# Patient Record
Sex: MALE
[De-identification: names, ages, dates, MRNs, and addresses within clinical notes are randomized per-mention and may not be internally consistent; named-entity substitution may affect disease eponyms.]

---

## 2017-07-19 ENCOUNTER — TRANSCRIPTION ENCOUNTER (OUTPATIENT)
Age: 62
End: 2017-07-19

## 2018-01-11 ENCOUNTER — TRANSCRIPTION ENCOUNTER (OUTPATIENT)
Age: 63
End: 2018-01-11

## 2020-02-03 ENCOUNTER — TRANSCRIPTION ENCOUNTER (OUTPATIENT)
Age: 65
End: 2020-02-03

## 2021-07-16 PROBLEM — Z00.00 ENCOUNTER FOR PREVENTIVE HEALTH EXAMINATION: Status: ACTIVE | Noted: 2021-07-16

## 2021-07-21 ENCOUNTER — APPOINTMENT (OUTPATIENT)
Dept: OTOLARYNGOLOGY | Facility: CLINIC | Age: 66
End: 2021-07-21
Payer: COMMERCIAL

## 2021-07-21 VITALS
SYSTOLIC BLOOD PRESSURE: 124 MMHG | BODY MASS INDEX: 25.05 KG/M2 | HEIGHT: 70 IN | WEIGHT: 175 LBS | HEART RATE: 53 BPM | DIASTOLIC BLOOD PRESSURE: 73 MMHG

## 2021-07-21 DIAGNOSIS — K31.9 DISEASE OF STOMACH AND DUODENUM, UNSPECIFIED: ICD-10-CM

## 2021-07-21 DIAGNOSIS — J34.2 DEVIATED NASAL SEPTUM: ICD-10-CM

## 2021-07-21 DIAGNOSIS — R13.12 DYSPHAGIA, OROPHARYNGEAL PHASE: ICD-10-CM

## 2021-07-21 DIAGNOSIS — Z87.891 PERSONAL HISTORY OF NICOTINE DEPENDENCE: ICD-10-CM

## 2021-07-21 DIAGNOSIS — Z83.3 FAMILY HISTORY OF DIABETES MELLITUS: ICD-10-CM

## 2021-07-21 DIAGNOSIS — C44.311 BASAL CELL CARCINOMA OF SKIN OF NOSE: ICD-10-CM

## 2021-07-21 DIAGNOSIS — Z88.9 ALLERGY STATUS TO UNSPECIFIED DRUGS, MEDICAMENTS AND BIOLOGICAL SUBSTANCES: ICD-10-CM

## 2021-07-21 DIAGNOSIS — H91.90 UNSPECIFIED HEARING LOSS, UNSPECIFIED EAR: ICD-10-CM

## 2021-07-21 DIAGNOSIS — J05.10 ACUTE EPIGLOTTITIS W/OUT OBSTRUCTION: ICD-10-CM

## 2021-07-21 DIAGNOSIS — I51.9 HEART DISEASE, UNSPECIFIED: ICD-10-CM

## 2021-07-21 DIAGNOSIS — J98.9 RESPIRATORY DISORDER, UNSPECIFIED: ICD-10-CM

## 2021-07-21 DIAGNOSIS — H93.13 TINNITUS, BILATERAL: ICD-10-CM

## 2021-07-21 DIAGNOSIS — R59.0 LOCALIZED ENLARGED LYMPH NODES: ICD-10-CM

## 2021-07-21 DIAGNOSIS — Z80.9 FAMILY HISTORY OF MALIGNANT NEOPLASM, UNSPECIFIED: ICD-10-CM

## 2021-07-21 PROCEDURE — 99072 ADDL SUPL MATRL&STAF TM PHE: CPT

## 2021-07-21 PROCEDURE — 92588 EVOKED AUDITORY TST COMPLETE: CPT

## 2021-07-21 PROCEDURE — 92550 TYMPANOMETRY & REFLEX THRESH: CPT

## 2021-07-21 PROCEDURE — 31575 DIAGNOSTIC LARYNGOSCOPY: CPT

## 2021-07-21 PROCEDURE — 92563 TONE DECAY HEARING TEST: CPT

## 2021-07-21 PROCEDURE — 92557 COMPREHENSIVE HEARING TEST: CPT

## 2021-07-21 PROCEDURE — 99244 OFF/OP CNSLTJ NEW/EST MOD 40: CPT | Mod: 25

## 2021-07-21 RX ORDER — FINASTERIDE 5 MG/1
5 TABLET, FILM COATED ORAL
Refills: 0 | Status: ACTIVE | COMMUNITY

## 2021-07-21 RX ORDER — TAMSULOSIN HYDROCHLORIDE 0.4 MG/1
CAPSULE ORAL
Refills: 0 | Status: ACTIVE | COMMUNITY

## 2021-07-21 RX ORDER — QUETIAPINE 400 MG/1
TABLET, FILM COATED ORAL
Refills: 0 | Status: ACTIVE | COMMUNITY

## 2021-07-21 NOTE — ADDENDUM
[FreeTextEntry1] : Documented by Abrahan Shelton acting as scribe for Dr. Gonzalez on 07/21/2021.\par \par All Medical record entries made by the Scribe were at my, Dr. Gonzalez, direction and personally dictated by me on 07/21/2021 . I have reviewed the chart and agree that the record accurately reflects my personal performance of the history, physical exam, assessment and plan. I have also personally directed, reviewed, and agreed with the discharge instructions.

## 2021-07-21 NOTE — DATA REVIEWED
[de-identified] : Tymps: Type A, ETF, doe AU\par present reflexes and negative tone decay, au\par OAES: present 1-2khz, au\par Results obtained via insert earphones revealed:\par Right -2khz, sloping to a severe hf SNHL 3-8khz\par Left: -2khz, Sloping to a moderately-severe hf SNHL 3-8khz\par Recs: 1) ENT f/u 2) re-eval as per MD 3) wicho-neuro w/up (Asymmetry noted HF AD) 4) Further recs pending above

## 2021-07-21 NOTE — REVIEW OF SYSTEMS
[Sneezing] : sneezing [Seasonal Allergies] : seasonal allergies [Post Nasal Drip] : post nasal drip [Hearing Loss] : hearing loss [Ear Noises] : ear noises [Problem Snoring] : problem snoring [Throat Clearing] : throat clearing [Eyes Itch] : itching of the eyes [Anxiety] : anxiety [Depression] : depression [Swollen Glands] : swollen glands [Negative] : Endocrine [FreeTextEntry1] : difficulty swallowing, rash, itching

## 2021-07-21 NOTE — PROCEDURE
[de-identified] : Procedure:  Flexible Fiberoptic Laryngoscopy: Risks, benefits, and alternatives of flexible endoscopy were explained to the patient.  The patient gave oral consent to proceed.  The flexible scope was inserted into the right nasal cavity and advanced towards the nasopharynx.  Visualized mucosa over the turbinates and septum were as described above.  The nasopharynx was clear.  Oropharyngeal walls were symmetric and mobile without lesion, mass, or edema.  Hypopharynx was also without  lesion or edema.  Larynx was mobile without lesions. Epiglottis is bright red and inflamed.  True vocal folds were white without mass or lesion.  Base of tongue was within normal limits.

## 2021-07-21 NOTE — ASSESSMENT
[FreeTextEntry1] : Reviewed and reconciled medications, allergies, PMHx, PSHx, SocHx, FMHx.\par \par lateralization to the right\par Deviated septum right greater than left \par \par Audio: Tymps: Type A, ETF, doe AU. present reflexes and negative tone decay, au.OAES: present 1-2khz, au.\par Right -2khz, sloping to a severe hf SNHL 3-8khz\par Left: -2khz, Sloping to a moderately-severe hf SNHL 3-8khz. \par \par US thyroid  5/6/2021 which revealed level 4 enlarged lymph node  1.6 x 0.6 x 0.5cm.\par \par US thyroid 7/15/2021 :showing slight .decrease in size from 1.6 x 0.6 x 0.5cm to 1.2x  0.6 x 0.5cm. Benign appearing with intact hilum. \par \par \par Plan:\par Flexible Laryngoscopy. Discussed r/b/a of needle biopsy. Complete US thyroid in 3 months. FU in 3 months.\par

## 2021-07-21 NOTE — CONSULT LETTER
[Dear  ___] : Dear  [unfilled], [Consult Letter:] : I had the pleasure of evaluating your patient, [unfilled]. [Please see my note below.] : Please see my note below. [Consult Closing:] : Thank you very much for allowing me to participate in the care of this patient.  If you have any questions, please do not hesitate to contact me. [Sincerely,] : Sincerely, [FreeTextEntry3] : Carlos Enrique Gonzalez MD FACS

## 2021-07-21 NOTE — PHYSICAL EXAM
[Way Test Lateralizes To Right] : tone lateralization to the right [Midline] : trachea located in midline position [Normal] : no masses and lesions seen, face is symmetric [FreeTextEntry1] : deviated septum left greater than right. [FreeTextEntry2] : sinuses nontender to percussion. sensations intact.

## 2021-07-21 NOTE — HISTORY OF PRESENT ILLNESS
[de-identified] : The patient presents with a lump in his neck starting 4/2021 and was sent for a sonogram on 5/6/2021 which revealed level 4 enlarged lymph node, which was repeated on 7/15/2021 showing slight decrease in size of the lymph nodes.Pt has a h/o basal cell carcinoma on his nose. Of note, the pt has white noise tinnitus bilaterally, and active PND due to allergies to grass.

## 2022-08-21 ENCOUNTER — NON-APPOINTMENT (OUTPATIENT)
Age: 67
End: 2022-08-21

## 2023-07-28 ENCOUNTER — NON-APPOINTMENT (OUTPATIENT)
Age: 68
End: 2023-07-28

## 2024-01-11 ENCOUNTER — NON-APPOINTMENT (OUTPATIENT)
Age: 69
End: 2024-01-11

## 2024-12-20 ENCOUNTER — NON-APPOINTMENT (OUTPATIENT)
Age: 69
End: 2024-12-20